# Patient Record
(demographics unavailable — no encounter records)

---

## 2024-11-29 NOTE — PROCEDURE
[FreeTextEntry1] : pt doing well, no breakdowns to calves. callouses trimmed. feet moisturized with a and d ointment. pt to return in 3 mo

## 2025-04-02 NOTE — HISTORY OF PRESENT ILLNESS
[FreeTextEntry1] : 6 month follow up [de-identified] : 64 y/o M w/ PMHx of morbid obesity, DM, HTN, PVD, chronic venous stasis,   L DFU s/p debridement x3 presenting for follow up. patient feels well and requests a letter for inability to walk to apply for disability and park closer to the building

## 2025-04-02 NOTE — ASSESSMENT
[FreeTextEntry1] : 64 y/o M w/ PMHx of morbid obesity, DM, HTN, PVD, chronic venous stasis, s/p debridement x3 of L DFU presenting for follow up.  #Essential HTN, well-controlled - C/w home Lisinopril 40 mg qd - C/w home HCTZ 12.5 mg qd - BP today - 108/69 - Counselled on low fat, salt and carb diet and regular exercise - DASH diet education - daily BP measurement  #T2DM - Last A1c 6.6% (2/2024) - C/w Jardiance 25 mg qd - C/w Mounjaro 15 mg q1 week - Follows Endo - Last saw Optho in 2021(encouraged to see Optho annually) - follows with podiatry  #DLD - HDL: 31 mg/dl - Counselled on low fat, salt and carb diet and regular exercise   #Hx of Vit D deficiency - no recent blood work - will ordere next visit - 4 week vit D supplement   #Morbid Obesity - Wt 319 lb > 326 lb > 323 lb - BMI >50 - Losing weight on Mounjaro  - Follows Nutritionist - Not interested in bariatric surgery - Encouraged lifestyle modification (diet + exercise)  #L DFU #L ankle pain - resolved  - S/p debridement - Follows Podiatry (Dr. Bishop) last seen   #HCM - last colonoscopy in 2020- uneventful as per patient. Next colonoscopy due in 2025 per pt - Quit smoking 43 years ago - COVID vaccination - AAA screening due 2027 - labs reordered last blood work from Feb 24 rto in 3 months and prn

## 2025-04-02 NOTE — PHYSICAL EXAM
[No Acute Distress] : no acute distress [Well-Appearing] : well-appearing [Normal Sclera/Conjunctiva] : normal sclera/conjunctiva [Normal Outer Ear/Nose] : the outer ears and nose were normal in appearance [No JVD] : no jugular venous distention [No Respiratory Distress] : no respiratory distress  [No Accessory Muscle Use] : no accessory muscle use [Soft] : abdomen soft [Non Tender] : non-tender [Non-distended] : non-distended [Normal] : no CVA or spinal tenderness [de-identified] : LE debbie [de-identified] : xerosis of the b/l LEs [de-identified] : seen by podiatry

## 2025-06-12 NOTE — REVIEW OF SYSTEMS
[Headaches] : no headaches [Negative] : Heme/Lymph [FreeTextEntry9] : history of bone fractures [de-identified] : baseline  [de-identified] : lost some weight with meds

## 2025-06-12 NOTE — HISTORY OF PRESENT ILLNESS
[FreeTextEntry1] : 64 year old male with PMH of obesity, DM, HTN, PVD w/ chronic venous stasis s/p debridement of left foot ulcers presenting to the endocrinology clinic for follow up visit.  He was last seen around a year ago he is currently taking Jardiance and Mounjaro 10 mg every week previously was on up to 15 mg but due to supply issues had to go down to 10 mg appears that his weight has gone up by 7 pounds since the last visit

## 2025-06-12 NOTE — ASSESSMENT
[FreeTextEntry1] : 64- year old male with PMH of obesity, DM, HTN, PVD w/ chronic venous stasis s/p debridement of left foot ulcers presenting to the endocrinology clinic for follow up visit.   #Type 2 DM with neuropathy  Most recent A1c 6.0 04/25 - Currently on Jardiance 25 mg daily was previously on up to 50 mg of Mounjaro but currently on 10 mg as there was supply chain issues last year was lost to follow-up for the last year - Seems to be available to increase the dose to 12.5 mg and if he tolerates the same 4 weeks we will increase it to 15 mg - missed podiatry appt will reschedule, referred to ophthalmology  - On pravastatin 40 mg dailyLDL at goal  - Would be a candidate for bariatric surgery, discussed this with the patient but he is currently not interested - f/u in 6 months

## 2025-06-12 NOTE — PHYSICAL EXAM
[Alert] : alert [Well Nourished] : well nourished [EOMI] : extra ocular movement intact [Normal Outer Ear/Nose] : the ears and nose were normal in appearance [Supple] : the neck was supple [No Respiratory Distress] : no respiratory distress [Normal S1, S2] : normal S1 and S2 [Not Tender] : non-tender [Soft] : abdomen soft [No Involuntary Movements] : no involuntary movements were seen [No Tremors] : no tremors [Oriented x3] : oriented to person, place, and time [de-identified] : swelling lower extremity, though non pitting edema  [de-identified] : thickening of skin lower extremities